# Patient Record
Sex: MALE | Race: WHITE | NOT HISPANIC OR LATINO | Employment: PART TIME | URBAN - METROPOLITAN AREA
[De-identification: names, ages, dates, MRNs, and addresses within clinical notes are randomized per-mention and may not be internally consistent; named-entity substitution may affect disease eponyms.]

---
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
## 2024-02-26 ENCOUNTER — APPOINTMENT (OUTPATIENT)
Dept: ULTRASOUND IMAGING | Facility: HOSPITAL | Age: 19
End: 2024-02-26
Payer: OTHER GOVERNMENT

## 2024-02-26 LAB
BILIRUB UR QL STRIP: NEGATIVE
C TRACH RRNA CVX QL NAA+PROBE: NOT DETECTED
CLARITY UR: CLEAR
COLOR UR: YELLOW
GLUCOSE UR STRIP-MCNC: NEGATIVE MG/DL
HGB UR QL STRIP.AUTO: NEGATIVE
KETONES UR QL STRIP: NEGATIVE
LEUKOCYTE ESTERASE UR QL STRIP.AUTO: NEGATIVE
N GONORRHOEA RRNA SPEC QL NAA+PROBE: NOT DETECTED
NITRITE UR QL STRIP: NEGATIVE
PH UR STRIP.AUTO: 6 [PH] (ref 5–8)
PROT UR QL STRIP: NEGATIVE
SP GR UR STRIP: >=1.03 (ref 1–1.03)
UROBILINOGEN UR QL STRIP: NORMAL

## 2024-02-26 PROCEDURE — 81003 URINALYSIS AUTO W/O SCOPE: CPT

## 2024-02-26 PROCEDURE — 99284 EMERGENCY DEPT VISIT MOD MDM: CPT

## 2024-02-26 PROCEDURE — 87591 N.GONORRHOEAE DNA AMP PROB: CPT

## 2024-02-26 PROCEDURE — 87491 CHLMYD TRACH DNA AMP PROBE: CPT

## 2024-02-26 PROCEDURE — 76870 US EXAM SCROTUM: CPT

## 2024-02-27 ENCOUNTER — HOSPITAL ENCOUNTER (EMERGENCY)
Facility: HOSPITAL | Age: 19
Discharge: HOME | End: 2024-02-27
Attending: EMERGENCY MEDICINE | Admitting: EMERGENCY MEDICINE
Payer: OTHER GOVERNMENT

## 2024-02-27 VITALS
HEART RATE: 73 BPM | SYSTOLIC BLOOD PRESSURE: 120 MMHG | TEMPERATURE: 98.9 F | DIASTOLIC BLOOD PRESSURE: 61 MMHG | WEIGHT: 198.85 LBS | RESPIRATION RATE: 16 BRPM | OXYGEN SATURATION: 99 % | BODY MASS INDEX: 23.58 KG/M2

## 2024-02-27 DIAGNOSIS — I86.1 VARICOCELE: ICD-10-CM

## 2024-02-27 DIAGNOSIS — R10.9 ABDOMINAL PAIN, UNSPECIFIED ABDOMINAL LOCATION: Primary | ICD-10-CM

## 2024-02-27 DIAGNOSIS — N50.812 PAIN IN LEFT TESTICLE: ICD-10-CM

## 2024-02-27 NOTE — ED PROVIDER NOTES
Time: 8:38 PM EST  Date of encounter:  2/26/2024  Independent Historian/Clinical History and Information was obtained by:   Patient    History is limited by: N/A    Chief complaint: left testicular pain        History of Present Illness:  Patient is a 18 y.o. year old male who presents to the emergency department for evaluation of left testicular pain.  Patient states that approximately around 6 PM he developed some suprapubic pain and then developed some left testicular pain.  He states the pain lasted for about 3 hours.  He describes as aching.  The pain is completely gone at this time.  The patient states initially had some nausea but that resolved.  The patient had no vomiting.  The patient's had no fever, rigors or myalgias.  The patient states his bowel movement.  The patient has no hematochezia or diarrhea.  The patient has no urinary frequency urgency or dysuria.  The patient states that he is not sexually active.  The patient denies any penile discharge.  Patient denies any discoloration or redness to the scrotum.  Patient does note that he has intermittent testicular pain about every 2 weeks.  Patient has never discussed this with his primary care physician.  He does note that all symptoms are completely gone at this time.      Patient Care Team  Primary Care Provider: Fatimah Donovan APRN    Past Medical History:     Allergies   Allergen Reactions    Eggs Or Egg-Derived Products Other (See Comments)     Throat swelling and GI issues     No past medical history on file.  No past surgical history on file.  No family history on file.    Home Medications:  Prior to Admission medications    Not on File        Social History:   Social History     Tobacco Use    Smoking status: Never     Passive exposure: Never    Smokeless tobacco: Never   Vaping Use    Vaping Use: Never used   Substance Use Topics    Alcohol use: Not Currently    Drug use: Never         Review of Systems:  Review of Systems   Constitutional:   Negative for chills, diaphoresis and fever.   HENT:  Negative for congestion, postnasal drip, rhinorrhea and sore throat.    Eyes:  Negative for photophobia.   Respiratory:  Negative for cough, chest tightness and shortness of breath.    Cardiovascular:  Negative for chest pain, palpitations and leg swelling.   Gastrointestinal:  Positive for abdominal pain. Negative for diarrhea, nausea and vomiting.   Genitourinary:  Positive for testicular pain. Negative for difficulty urinating, dysuria, flank pain, frequency, genital sores, hematuria, penile discharge, penile pain, penile swelling, scrotal swelling and urgency.   Musculoskeletal:  Negative for neck pain and neck stiffness.   Skin:  Negative for pallor and rash.   Neurological:  Negative for dizziness, syncope, weakness, numbness and headaches.   Hematological:  Negative for adenopathy. Does not bruise/bleed easily.   Psychiatric/Behavioral: Negative.          Physical Exam:  /61 (BP Location: Right arm, Patient Position: Sitting)   Pulse 73   Temp 98.9 °F (37.2 °C)   Resp 16   Wt 90.2 kg (198 lb 13.7 oz)   SpO2 99%   BMI 23.58 kg/m²         Physical Exam  Vitals and nursing note reviewed.   Constitutional:       General: He is not in acute distress.     Appearance: Normal appearance. He is not ill-appearing, toxic-appearing or diaphoretic.   HENT:      Head: Normocephalic and atraumatic.      Mouth/Throat:      Mouth: Mucous membranes are moist.   Eyes:      Extraocular Movements: Extraocular movements intact.      Conjunctiva/sclera: Conjunctivae normal.      Pupils: Pupils are equal, round, and reactive to light.   Cardiovascular:      Rate and Rhythm: Normal rate and regular rhythm.      Pulses: Normal pulses.           Carotid pulses are 2+ on the right side and 2+ on the left side.       Radial pulses are 2+ on the right side and 2+ on the left side.        Femoral pulses are 2+ on the right side and 2+ on the left side.       Popliteal pulses  are 2+ on the right side and 2+ on the left side.        Dorsalis pedis pulses are 2+ on the right side and 2+ on the left side.        Posterior tibial pulses are 2+ on the right side and 2+ on the left side.      Heart sounds: Normal heart sounds. No murmur heard.  Pulmonary:      Effort: Pulmonary effort is normal. No accessory muscle usage, respiratory distress or retractions.      Breath sounds: Normal breath sounds. No wheezing, rhonchi or rales.   Abdominal:      General: Abdomen is flat. There is no distension.      Palpations: Abdomen is soft. There is no mass or pulsatile mass.      Tenderness: There is no abdominal tenderness. There is no right CVA tenderness, left CVA tenderness, guarding or rebound.      Hernia: No hernia is present. There is no hernia in the left inguinal area or right inguinal area.      Comments: No rigidity   Genitourinary:     Testes: Normal. Cremasteric reflex is present.         Right: Mass, tenderness or swelling not present.         Left: Mass, tenderness or swelling not present.      Epididymis:      Right: Normal. Not inflamed. No mass or tenderness.      Left: Normal. Not inflamed. No mass or tenderness.   Musculoskeletal:         General: No swelling, tenderness or deformity.      Cervical back: Neck supple. No tenderness.      Right lower leg: No edema.      Left lower leg: No edema.   Lymphadenopathy:      Lower Body: No right inguinal adenopathy. No left inguinal adenopathy.   Skin:     General: Skin is warm and dry.      Capillary Refill: Capillary refill takes less than 2 seconds.      Coloration: Skin is not cyanotic, jaundiced or pale.      Findings: No erythema.   Neurological:      General: No focal deficit present.      Mental Status: He is alert and oriented to person, place, and time. Mental status is at baseline.      Cranial Nerves: Cranial nerves 2-12 are intact. No cranial nerve deficit.      Sensory: Sensation is intact. No sensory deficit.      Motor:  Motor function is intact. No weakness or pronator drift.      Coordination: Coordination is intact. Coordination normal.   Psychiatric:         Attention and Perception: Attention and perception normal.         Mood and Affect: Mood normal.         Behavior: Behavior normal.                Procedures:  Procedures      Medical Decision Making:      Comorbidities that affect care:    None    External Notes reviewed:    None      The following orders were placed and all results were independently analyzed by me:  Orders Placed This Encounter   Procedures    Chlamydia trachomatis, Neisseria gonorrhoeae, PCR - Urine, Urine, Random Void    US Scrotum & Testicles    Urinalysis With Microscopic If Indicated (No Culture) - Urine, Clean Catch       Medications Given in the Emergency Department:  Medications - No data to display     ED Course:    The patient was initially evaluated in the triage area where orders were placed. The patient was later dispositioned by Lazaro Carbajal DO.      The patient was advised to stay for completion of workup which includes but is not limited to communication of labs and radiological results, reassessment and plan. The patient was advised that leaving prior to disposition by a provider could result in critical findings that are not communicated to the patient.     ED Course as of 02/27/24 0221 Mon Feb 26, 2024 2039 --- PROVIDER IN TRIAGE NOTE ---    The patient was evaluated by Aide sanchez in triage. Orders were placed and the patient is currently awaiting disposition.    [AJ]      ED Course User Index  [AJ] Aide Mike PA-C       Labs:    Lab Results (last 24 hours)       Procedure Component Value Units Date/Time    POC Urinalysis Dipstick, Multipro (All Except Devan UCs) [[id number]]  (Abnormal) Resulted: 02/26/24 2001    Specimen: Urine Updated: 02/26/24 2001     Color Yellow     Clarity, UA Clear     Glucose, UA Negative mg/dL      Bilirubin Small (1+)     Ketones, UA  Negative     Specific Gravity  1.030     Blood, UA Negative     pH, Urine 5.5     Protein, POC Trace mg/dL      Urobilinogen, UA 0.2 E.U./dL     Nitrite, UA Negative     Leukocytes Negative    Urinalysis With Microscopic If Indicated (No Culture) - Urine, Clean Catch [[id number]]  (Normal) Collected: 02/26/24 2103    Specimen: Urine, Clean Catch Updated: 02/26/24 2134     Color, UA Yellow     Appearance, UA Clear     pH, UA 6.0     Specific Gravity, UA >=1.030     Glucose, UA Negative     Ketones, UA Negative     Bilirubin, UA Negative     Blood, UA Negative     Protein, UA Negative     Leuk Esterase, UA Negative     Nitrite, UA Negative     Urobilinogen, UA 1.0 E.U./dL    Narrative:      Urine microscopic not indicated.    Chlamydia trachomatis, Neisseria gonorrhoeae, PCR - Urine, Urine, Clean Catch [[id number]]  (Normal) Collected: 02/26/24 2103    Specimen: Urine, Clean Catch Updated: 02/26/24 2314     Chlamydia DNA by PCR Not Detected     Neisseria gonorrhoeae by PCR Not Detected             Imaging:    US Scrotum & Testicles    Result Date: 2/26/2024  PROCEDURE: US SCROTUM AND TESTICLES  COMPARISON: None.  INDICATIONS: LEFT SCROTAL PAIN.  TECHNIQUE:    The scrotum and testicles (testes) were evaluated by routine duplex ultrasound examination, including two-dimensional grayscale images as well as color/spectral duplex Doppler analysis.   FINDINGS:   TESTES: Normal.  No visible mass.  Normal echotexture, size, and flow are noted.  The right testis measures 3.7 x 1.8 x 2.7 cm.  The right testicular volume is 9.4 mL.  The left testis measures 4.1 x 2 x 2.3 cm.  The left testicular volume is 9.7 mL.  EPIDIDYMIDES: There is a suspected incidental benign 5 mm cyst involving the left epididymal head.  It is of doubtful clinical significance.  Otherwise, no acute findings are seen involving the epididymides.  No acute epididymitis.  The right epididymal head measures 1.3 x 0.9 cm in craniocaudal (CC) and anteroposterior  (AP) extent, respectively.  The left epididymal head measures 1 (CC) x 1 (AP) cm.  OTHER: Bilateral varicoceles are suggested.  No hydrocele is appreciated.  No bowel-containing inguinal hernia, extending into the scrotal sac, is identified.  No abnormalities of the scrotal wall are appreciated.         1. There are bilateral varicoceles.  2. There is a benign 5 mm left epididymal head cyst.  3. No testicular torsion.   4. No intrinsic or extrinsic testicular masses are seen.  5. No acute epididymo-orchitis is suggested.  6. No hydroceles are seen.  7. Please see above comments for further detail.   Please note that portions of this note were completed with a voice recognition program.  JOSE F WESTON JR, MD       Electronically Signed and Approved By: JOSE F WESTON JR, MD on 2/26/2024 at 22:11                 Differential Diagnosis and Discussion:      Testicular Pain: Differential diagnosis includes but is not limited to epididymitis, orchitis, testicular torsion, testicular tumor, testicular trauma, hydrocele, varicocele, spermatocele, prostatitis, scrotal cellulitis, and urolithiasis.    All labs were reviewed and interpreted by me.  Ultrasound impression was interpreted by me.     MDM  Number of Diagnoses or Management Options  Abdominal pain, unspecified abdominal location  Pain in left testicle  Varicocele  Diagnosis management comments: The patient's urinalysis was negative for obvious infection or blood    Ultrasound of the testicles demonstrates bilateral varicoceles.  The patient does have a benign 5 mm left epididymal head cyst.  There is no evidence of testicular torsion.  There is no evidence for epididymoorchitis.  No hydroceles are seen.  No other acute abnormalities.    A dirty urine is performed.  There is no evidence of gonorrhea or chlamydia.    At the time of my evaluation, the patient states all symptoms are gone.  The patient had no abdominal or suprapubic tenderness on examination.  The  patient had no testicular swelling, testicular pain on examination or any abnormalities on examination.    I will have the patient follow-up with her doctor tomorrow for serial reexamination.  I will have the patient discuss the possible need for urology referral due to the chronic intermittent testicular pain that he has been having.    The patient was given very specific instructions on when and why to return to the emergency room.  The patient voiced understanding and felt comfortable with the discharge instructions.  They would return to the emergency room if necessary.  The patient appears appropriate for discharge and outpatient follow-up.         Amount and/or Complexity of Data Reviewed  Clinical lab tests: reviewed  Tests in the radiology section of CPT®: reviewed  Tests in the medicine section of CPT®: reviewed         Social Determinants of Health:    Patient is independent, reliable, and has access to care.       Disposition and Care Coordination:    Discharged: The patient is suitable and stable for discharge with no need for consideration of admission.    I have explained discharge medications and the need for follow up with the patient/caretakers. This was also printed in the discharge instructions. Patient was discharged with the following medications and follow up:      Medication List      No changes were made to your prescriptions during this visit.      Fatimah Donovan, APRN  [address] [facility] [address]  [facility] [address]  [phone]    On 2/28/2024  Serial re-examination of the abdomen, call for appointment       Final diagnoses:   Abdominal pain, unspecified abdominal location   Pain in left testicle   Varicocele        ED Disposition       ED Disposition   Discharge    Condition   Stable    Comment   --               This medical record created using voice recognition software.             Lazaro Carbajal DO  02/27/24 0221

## 2024-02-27 NOTE — DISCHARGE INSTRUCTIONS
Please follow-up with your doctor tomorrow for serial reexamination the abdomen.      Return to the emergency room immediately for intractable pain, intractable vomiting, fever, shaking chills, muscle aches, near passing out, passing out or any new symptoms you are concerned with    Please discuss the need with your primary care physician about a referral to a urologist due to the chronic intermittent testicular pain that you have

## 2024-03-22 ENCOUNTER — OFFICE VISIT (OUTPATIENT)
Dept: UROLOGY | Facility: CLINIC | Age: 19
End: 2024-03-22
Payer: COMMERCIAL

## 2024-03-22 VITALS
WEIGHT: 204.4 LBS | SYSTOLIC BLOOD PRESSURE: 128 MMHG | BODY MASS INDEX: 24.13 KG/M2 | HEIGHT: 77 IN | HEART RATE: 71 BPM | DIASTOLIC BLOOD PRESSURE: 70 MMHG

## 2024-03-22 DIAGNOSIS — N50.3 EPIDIDYMAL CYST: Primary | ICD-10-CM

## 2024-03-22 DIAGNOSIS — I86.1 BILATERAL VARICOCELES: ICD-10-CM

## 2024-03-22 LAB
BILIRUB BLD-MCNC: NEGATIVE MG/DL
CLARITY, POC: CLEAR
COLOR UR: YELLOW
EXPIRATION DATE: NORMAL
GLUCOSE UR STRIP-MCNC: NEGATIVE MG/DL
KETONES UR QL: NEGATIVE
LEUKOCYTE EST, POC: NEGATIVE
Lab: NORMAL
NITRITE UR-MCNC: NEGATIVE MG/ML
PH UR: 7 [PH] (ref 5–8)
PROT UR STRIP-MCNC: NEGATIVE MG/DL
RBC # UR STRIP: NEGATIVE /UL
SP GR UR: 1.03 (ref 1–1.03)
UROBILINOGEN UR QL: NORMAL

## 2024-03-22 NOTE — PROGRESS NOTES
"Chief Complaint  EPIDIDYMAL CYST (PT SAYS NO PAIN FOUND OUT ABOUT THIS CYST ABOUT 2-3 WEEKS AGO WAS IN ED)    Ultrasound suggestive of bilateral varicoceles  Subjective no acute distress        Fred Ratliff presents to [facility]  History of Present Illness    Patient was seen in the emergency room around 27 February 2024 with suprapubic pain and testicular pain.  Pain was bad enough that mother got worried that he may have appendicitis.  Patient had ultrasound of testicles with showed a 5 mm epididymal cyst on the left side and bilateral varicoceles.  This patient has no problem since that time and pain is improved.    Objective no acute distress  Vital Signs:   /70 (BP Location: Left arm, Patient Position: Sitting, Cuff Size: Adult)   Pulse 71   Ht 195.6 cm (77\")   Wt 92.7 kg (204 lb 6.4 oz)   BMI 24.24 kg/m²     Allergies   Allergen Reactions    Eggs Or Egg-Derived Products Other (See Comments)     Throat swelling and GI issues      Past medical history:  has a past medical history of Monocytoses (04/13/2022).   Past surgical history:  has a past surgical history that includes Hand surgery (Left, 09/2022).  Personal history: family history is not on file.  Social history:  reports that he has never smoked. He has never been exposed to tobacco smoke. He has never used smokeless tobacco. He reports that he does not currently use alcohol. He reports that he does not use drugs.    Review of Systems    Please see past medical and surgical history and rest of the systems negative    Physical Exam  Constitutional:       General: He is not in acute distress.     Appearance: Normal appearance. He is normal weight. He is not ill-appearing or toxic-appearing.   HENT:      Head: Normocephalic and atraumatic.      Ears:      Comments: No loss of hearing  Pulmonary:      Effort: Pulmonary effort is normal. No respiratory distress.   Abdominal:      Palpations: Abdomen is soft. There is no " mass.      Tenderness: There is no abdominal tenderness.   Genitourinary:     Comments: Normal circumcised penis    Right and left scrotum is normal    Patient is very nervous so both testicles are drawn up next to inguinal ring.  Right and left testicles are smaller than normal almost 4 cm each and there is no tenderness present.  I do not feel any cyst in the epididymis but on ultrasound is very tiny.  I made him stand up and cough just to check for the impulse from bilateral varicoceles and I do not feel any impulse.  Musculoskeletal:         General: No swelling. Normal range of motion.   Skin:     General: Skin is warm.      Coloration: Skin is not jaundiced.   Neurological:      General: No focal deficit present.      Mental Status: He is alert and oriented to person, place, and time.      Motor: No weakness.      Gait: Gait normal.   Psychiatric:         Mood and Affect: Mood normal.         Behavior: Behavior normal.         Thought Content: Thought content normal.         Judgment: Judgment normal.        Result Review :                 Assessment and Plan    Diagnoses and all orders for this visit:    1. Epididymal cyst (Primary)  -     POC Urinalysis Dipstick, Automated    2. Bilateral varicoceles    Patient is asymptomatic at this time.  I do not feel anything wrong on physical examination accept bilaterally smaller testes than normal.  I will ask him to never take testosterone which will make his testicles smaller.  I have told him the relationship between varicocele and infertility.  I do not feel any significant varicocele on examination.    I reassured the patient that he is normal.  If he is in for trial later on then he can think about bilateral varicocelectomy.  Patient and mother understand and I will be happy to see him on as needed basis.     Brief Urine Lab Results  (Last result in the past 365 days)        Color   Clarity   Blood   Leuk Est   Nitrite   Protein   CREAT   Urine HCG         03/22/24 1003 Yellow   Clear   Negative   Negative   Negative   Negative                    Follow Up   No follow-ups on file.  Patient was given instructions and counseling regarding his condition or for health maintenance advice. Please see specific information pulled into the AVS if appropriate.     Viviana Gil MD

## 2025-05-21 ENCOUNTER — OFFICE VISIT (OUTPATIENT)
Dept: FAMILY MEDICINE CLINIC | Facility: CLINIC | Age: 20
End: 2025-05-21
Payer: COMMERCIAL

## 2025-05-21 ENCOUNTER — LAB (OUTPATIENT)
Dept: LAB | Facility: HOSPITAL | Age: 20
End: 2025-05-21
Payer: COMMERCIAL

## 2025-05-21 VITALS
HEART RATE: 75 BPM | WEIGHT: 210.2 LBS | DIASTOLIC BLOOD PRESSURE: 75 MMHG | SYSTOLIC BLOOD PRESSURE: 142 MMHG | HEIGHT: 77 IN | OXYGEN SATURATION: 100 % | BODY MASS INDEX: 24.82 KG/M2

## 2025-05-21 DIAGNOSIS — Z00.00 WELL ADULT EXAM: Primary | ICD-10-CM

## 2025-05-21 DIAGNOSIS — R79.89 ELEVATED LFTS: ICD-10-CM

## 2025-05-21 DIAGNOSIS — Z11.59 ENCOUNTER FOR HEPATITIS C SCREENING TEST FOR LOW RISK PATIENT: ICD-10-CM

## 2025-05-21 DIAGNOSIS — Z13.220 LIPID SCREENING: ICD-10-CM

## 2025-05-21 LAB
ALBUMIN SERPL-MCNC: 4.4 G/DL (ref 3.5–5.2)
ALBUMIN/GLOB SERPL: 1.6 G/DL
ALP SERPL-CCNC: 100 U/L (ref 39–117)
ALT SERPL W P-5'-P-CCNC: 26 U/L (ref 1–41)
ANION GAP SERPL CALCULATED.3IONS-SCNC: 11.4 MMOL/L (ref 5–15)
AST SERPL-CCNC: 25 U/L (ref 1–40)
BASOPHILS # BLD AUTO: 0.02 10*3/MM3 (ref 0–0.2)
BASOPHILS NFR BLD AUTO: 0.4 % (ref 0–1.5)
BILIRUB SERPL-MCNC: 0.4 MG/DL (ref 0–1.2)
BUN SERPL-MCNC: 13 MG/DL (ref 6–20)
BUN/CREAT SERPL: 12.5 (ref 7–25)
CALCIUM SPEC-SCNC: 9.5 MG/DL (ref 8.6–10.5)
CHLORIDE SERPL-SCNC: 104 MMOL/L (ref 98–107)
CHOLEST SERPL-MCNC: 184 MG/DL (ref 0–200)
CO2 SERPL-SCNC: 25.6 MMOL/L (ref 22–29)
CREAT SERPL-MCNC: 1.04 MG/DL (ref 0.76–1.27)
DEPRECATED RDW RBC AUTO: 40.6 FL (ref 37–54)
EGFRCR SERPLBLD CKD-EPI 2021: 104.8 ML/MIN/1.73
EOSINOPHIL # BLD AUTO: 0.2 10*3/MM3 (ref 0–0.4)
EOSINOPHIL NFR BLD AUTO: 4.3 % (ref 0.3–6.2)
ERYTHROCYTE [DISTWIDTH] IN BLOOD BY AUTOMATED COUNT: 12.3 % (ref 12.3–15.4)
GLOBULIN UR ELPH-MCNC: 2.7 GM/DL
GLUCOSE SERPL-MCNC: 91 MG/DL (ref 65–99)
HCT VFR BLD AUTO: 44.9 % (ref 37.5–51)
HCV AB SER QL: NORMAL
HDLC SERPL-MCNC: 61 MG/DL (ref 40–60)
HGB BLD-MCNC: 14.5 G/DL (ref 13–17.7)
IMM GRANULOCYTES # BLD AUTO: 0 10*3/MM3 (ref 0–0.05)
IMM GRANULOCYTES NFR BLD AUTO: 0 % (ref 0–0.5)
LDLC SERPL CALC-MCNC: 113 MG/DL (ref 0–100)
LDLC/HDLC SERPL: 1.84 {RATIO}
LYMPHOCYTES # BLD AUTO: 2.13 10*3/MM3 (ref 0.7–3.1)
LYMPHOCYTES NFR BLD AUTO: 46.2 % (ref 19.6–45.3)
MCH RBC QN AUTO: 29.4 PG (ref 26.6–33)
MCHC RBC AUTO-ENTMCNC: 32.3 G/DL (ref 31.5–35.7)
MCV RBC AUTO: 90.9 FL (ref 79–97)
MONOCYTES # BLD AUTO: 0.3 10*3/MM3 (ref 0.1–0.9)
MONOCYTES NFR BLD AUTO: 6.5 % (ref 5–12)
NEUTROPHILS NFR BLD AUTO: 1.96 10*3/MM3 (ref 1.7–7)
NEUTROPHILS NFR BLD AUTO: 42.6 % (ref 42.7–76)
NRBC BLD AUTO-RTO: 0 /100 WBC (ref 0–0.2)
PLATELET # BLD AUTO: 208 10*3/MM3 (ref 140–450)
PMV BLD AUTO: 10.8 FL (ref 6–12)
POTASSIUM SERPL-SCNC: 4.2 MMOL/L (ref 3.5–5.2)
PROT SERPL-MCNC: 7.1 G/DL (ref 6–8.5)
RBC # BLD AUTO: 4.94 10*6/MM3 (ref 4.14–5.8)
SODIUM SERPL-SCNC: 141 MMOL/L (ref 136–145)
TRIGL SERPL-MCNC: 53 MG/DL (ref 0–150)
VLDLC SERPL-MCNC: 10 MG/DL (ref 5–40)
WBC NRBC COR # BLD AUTO: 4.61 10*3/MM3 (ref 3.4–10.8)

## 2025-05-21 PROCEDURE — 80053 COMPREHEN METABOLIC PANEL: CPT

## 2025-05-21 PROCEDURE — 80061 LIPID PANEL: CPT

## 2025-05-21 PROCEDURE — 85025 COMPLETE CBC W/AUTO DIFF WBC: CPT

## 2025-05-21 PROCEDURE — 36415 COLL VENOUS BLD VENIPUNCTURE: CPT

## 2025-05-21 PROCEDURE — 86803 HEPATITIS C AB TEST: CPT

## 2025-05-21 NOTE — PATIENT INSTRUCTIONS
Updated annual wellness visit checklist.  Immunizations discussed.  Screening discussed and/or ordered.  Recommend yearly dental and eye exams. Also discussed monitoring of blood pressure and lipids.      Patient was given instructions and counseling regarding his condition or for health maintenance advice. Please see specific information pulled into the AVS if appropriate.

## 2025-05-21 NOTE — PROGRESS NOTES
Chief Complaint    Annual Exam  Establish Care and Annual Exam    Subjective     Problem List  Visit Diagnosis   Encounters  Notes  Medications  Labs  Result Review Imaging  Media     Fred Ratliff is a 19 y.o. male who presents to [facility]    Annual Exam    History of Present Illness    19-year-old male that presents to [facility].  Notes that he vigorously exercises a couple times a week.  Discussed general recommendation for 30 minutes of moderate exercise 5 days weekly.  Patient notes diet is not terrible but could be better.  Notes having both fast food and junk food in his regular diet.  Patient denies any mood concerns.  Patient states he last saw his dentist in December 2020 for.  Patient believes last vision exam was around 2023, patient denies any visual concerns.  Patient denies any chest pain or shortness of breath; denies any urinary issues or black or bloody stool; denies any neurological symptoms such as numbness, tingling, syncope.  In reviewing past blood work it appears that patient did have elevated LFTs in the past, he notes that this was secondary to mono.  They had improved but had not completely returned to normal so we are checking lab work we can recheck this.  Patient is open to hepatitis C screening.  Patient has a family history of hyperlipidemia, patient is not aware of ever having a lipid screening, we discussed having this checked as well.     Review of Systems       Medical History: has a past medical history of Monocytoses (04/13/2022).     Surgical History: has a past surgical history that includes Hand surgery (Left, 09/2022).     Family History: family history includes Hyperlipidemia in his father.     Social History: reports that he has never smoked. He has never been exposed to tobacco smoke. He has never used smokeless tobacco. He reports current alcohol use of about 1.0 standard drink of alcohol per week. He reports that he does  "not use drugs.    Allergies: Egg-derived products      Health Maintenance Due   Topic Date Due    HPV VACCINES (1 - Male 3-dose series) Never done    HEPATITIS C SCREENING  Never done    ANNUAL PHYSICAL  Never done            Current Outpatient Medications:     EPINEPHrine 0.3 MG/0.3ML solution prefilled syringe, as directed Injection use as directed for anaphylaxis for 1 days, Disp: , Rfl:       Immunization History   Administered Date(s) Administered    COVID-19 (PFIZER) Purple Cap Monovalent 05/07/2021, 06/03/2021         Objective       Vitals:    05/21/25 0858   BP: 142/75   Pulse: 75   SpO2: 100%   Weight: 95.3 kg (210 lb 3.2 oz)   Height: 195.6 cm (77\")      Body mass index is 24.93 kg/m².   Wt Readings from Last 3 Encounters:   05/21/25 95.3 kg (210 lb 3.2 oz)   03/22/24 92.7 kg (204 lb 6.4 oz)   02/26/24 90.2 kg (198 lb 13.7 oz)      BP Readings from Last 3 Encounters:   05/21/25 142/75   03/22/24 128/70   02/27/24 120/61        BMI is within normal parameters. No other follow-up for BMI required.        Physical Exam  Vitals reviewed.   Constitutional:       General: He is not in acute distress.     Appearance: Normal appearance.   HENT:      Head: Normocephalic and atraumatic.      Nose: No congestion.      Mouth/Throat:      Mouth: Mucous membranes are moist.   Eyes:      Conjunctiva/sclera: Conjunctivae normal.   Cardiovascular:      Rate and Rhythm: Normal rate and regular rhythm.      Heart sounds: Normal heart sounds.   Pulmonary:      Effort: Pulmonary effort is normal.      Breath sounds: Normal breath sounds.   Musculoskeletal:         General: No swelling.      Cervical back: Normal range of motion and neck supple.   Lymphadenopathy:      Cervical: No cervical adenopathy.   Skin:     General: Skin is warm.   Neurological:      General: No focal deficit present.      Mental Status: He is alert.   Psychiatric:         Mood and Affect: Mood normal.         Behavior: Behavior normal.         Physical " Exam        Result Review :Labs  Result Review  Imaging  Med Tab  Media           Results                   Assessment and Plan {CC Problem List  Visit Diagnosis  ROS  Review (Popup)  Health Maintenance  Quality  BestPractice  Medications  SmartSets  SnapShot Encounters  Media     Assessment & Plan      Diagnoses and all orders for this visit:    1. Well adult exam (Primary)    2. Elevated LFTs  -     CBC w AUTO Differential; Future  -     Comprehensive metabolic panel; Future    3. Encounter for hepatitis C screening test for low risk patient  -     Hepatitis C antibody; Future    4. Lipid screening  -     Lipid panel; Future      Healthy well-appearing male, wellness handout/advice was given; screenings and immunizations were reviewed.  Labs were ordered.    Follow Up Instructions Charge Capture  Follow-up Communications     No follow-ups on file.    Updated annual wellness visit checklist.  Immunizations discussed.  Screening discussed and/or ordered.  Recommend yearly dental and eye exams. Also discussed monitoring of blood pressure and lipids.      Patient was given instructions and counseling regarding his condition or for health maintenance advice. Please see specific information pulled into the AVS if appropriate.       Kush Tovar, DO

## 2025-05-23 ENCOUNTER — PATIENT ROUNDING (BHMG ONLY) (OUTPATIENT)
Dept: FAMILY MEDICINE CLINIC | Facility: CLINIC | Age: 20
End: 2025-05-23
Payer: COMMERCIAL